# Patient Record
(demographics unavailable — no encounter records)

---

## 2025-04-08 NOTE — PHYSICAL EXAM
[Distal Radius] : distal radius [NL (75)] : Dorsiflexion 75 degrees [NL (85)] : volarflexion 85 degrees [5___] : volarflexion 5[unfilled]/5 [Right] : right wrist [There are no fractures, subluxations or dislocations. No significant abnormalities are seen] : There are no fractures, subluxations or dislocations. No significant abnormalities are seen [] : light touch intact throughout

## 2025-04-08 NOTE — ASSESSMENT
[FreeTextEntry1] : Xrays reviewed with patient and patient's mother Treatment options discussed  Thumb spica brace applied today for stability Ice / otc anti-inflammatories as needed  No phys ed  Follow up with  in 1 week

## 2025-04-08 NOTE — HISTORY OF PRESENT ILLNESS
[Sudden] : sudden [8] : 8 [Sharp] : sharp [Constant] : constant [Student] : Work status: student [de-identified] : 04/08/2025: Patient is a 12 yo RHD male c/o right wrist pain for 3 days after he was hit with a lacrosse stick in the wrist. No n/t. Not taking any medication for pain. Pain is worse with activity. Hx of wrist sprain. No previous surgeries to right wrist.  [] : no [FreeTextEntry5] : pt was playing lacrose for [employer] and got hit in the rt wrist with the lacrosse stick during the game on saturday 4/5/25. no meds no ice.

## 2025-04-15 NOTE — RETURN TO WORK/SCHOOL
[FreeTextEntry1] :    Diagnosis: right wrist contusion & sprain   X: Patient may return to gym/sports Apr 15, 2025     X: Patient may return to full activities Apr 15, 2025     Sincerely, Mirza Licea MD Co- Chief Sports Medicine [facility] Medical Director [facility] and [facility]  [facility] [employer]

## 2025-04-15 NOTE — HISTORY OF PRESENT ILLNESS
[de-identified] : Pt is here for an eval of his right wrist. Got hit in the wrist during a lacrosse game 4/5, went to  and did XR. Feeling better, wearing brace. Taking break from lacrosse. [employer]. School lacrosse game. Previous right wrist sprain last year.

## 2025-04-15 NOTE — DISCUSSION/SUMMARY
[de-identified] : I spoke with the urgent care provider, reviewed notes, and images.  The patients condition is acute.  Confounding medical conditions/concerns: None Tests/Studies Independently Interpreted Today: X-Ray right wrist is benign, no obvious bony abnormalities nor fracture, near closed physis.    Advised the patient that their clinical examination and mechanism of injury are consistent with a wrist sprain & contusion. Discussed their diagnosis and treatment options at length including the risks and benefits of both surgical and non-surgical options however, not indicated at this time. Not concerned for any physeal injury as they are closing and clinical exam does not correlate.   The patient was advised to let pain guide the gradual advancement of activities. They have no activity restrictions at this time. Discussed the importance of increasing activity on an interval basis. If his wrist pain is not fully resolved in one week, we will discuss obtaining an MRI  Prescribed the patient Motrin 600mgs and discussed risks of side effects and timing and management of medication. Side effects include but are not limited to gi ulcers and irritation, as well as kidney failure and bleeding issues.   Follow up in 2 weeks if any discomfort persists

## 2025-04-15 NOTE — DISCUSSION/SUMMARY
[de-identified] : I spoke with the urgent care provider, reviewed notes, and images.  The patients condition is acute.  Confounding medical conditions/concerns: None Tests/Studies Independently Interpreted Today: X-Ray right wrist is benign, no obvious bony abnormalities nor fracture, near closed physis.    Advised the patient that their clinical examination and mechanism of injury are consistent with a wrist sprain & contusion. Discussed their diagnosis and treatment options at length including the risks and benefits of both surgical and non-surgical options however, not indicated at this time. Not concerned for any physeal injury as they are closing and clinical exam does not correlate.   The patient was advised to let pain guide the gradual advancement of activities. They have no activity restrictions at this time. Discussed the importance of increasing activity on an interval basis. If his wrist pain is not fully resolved in one week, we will discuss obtaining an MRI  Prescribed the patient Motrin 600mgs and discussed risks of side effects and timing and management of medication. Side effects include but are not limited to gi ulcers and irritation, as well as kidney failure and bleeding issues.   Follow up in 2 weeks if any discomfort persists

## 2025-04-15 NOTE — PHYSICAL EXAM
[NL (75)] : Dorsiflexion 75 degrees [NL (85)] : volarflexion 85 degrees [NL (25)] : radial deviation 25 degrees [NL (40)] : ulnar deviation 40 degrees [NL (90)] : supination 90 degrees [5___] : finger abductor 5[unfilled]/5 [] : good capillary refill in all fingers [Right] : right wrist [FreeTextEntry8] :  X-Ray right wrist is benign, no obvious bony abnormalities nor fracture, near closed physis.

## 2025-04-15 NOTE — HISTORY OF PRESENT ILLNESS
[de-identified] : Pt is here for an eval of his right wrist. Got hit in the wrist during a lacrosse game 4/5, went to  and did XR. Feeling better, wearing brace. Taking break from lacrosse. [employer]. School lac[employer] game. Previous right wrist sprain last year.

## 2025-04-15 NOTE — REASON FOR VISIT
Bianca was calling because she said Millie had called her and said to call back. She assumes it was something to do with her surgery on 6/27. Please call back, Thanks.  
Per Dr. Barros patient is unable to do surgery at this time as her A1C is still at 12.1. Advised patient we can reschedule for 3 months from now while she works on her blood sugars or cancel until she has an A1C reading less than 8. Patient is upset and disappointed as she really wants to have her surgery and at this time wishes to cancel the surgery. Patient will call back with A1C is controlled to reschedule.  
[FreeTextEntry2] : right wrist
[FreeTextEntry2] : right wrist

## 2025-04-15 NOTE — RETURN TO WORK/SCHOOL
[FreeTextEntry1] :    Diagnosis: right wrist contusion & sprain   X: Patient may return to gym/sports Apr 15, 2025     X: Patient may return to full activities Apr 15, 2025     Sincerely, Mirza Licea MD Co- Chief Sports Medicine [facility] Medical Director [facility]  [facility] [employer]